# Patient Record
Sex: FEMALE | Race: WHITE | NOT HISPANIC OR LATINO | Employment: UNEMPLOYED | URBAN - NONMETROPOLITAN AREA
[De-identification: names, ages, dates, MRNs, and addresses within clinical notes are randomized per-mention and may not be internally consistent; named-entity substitution may affect disease eponyms.]

---

## 2023-07-15 ENCOUNTER — HOSPITAL ENCOUNTER (EMERGENCY)
Facility: HOSPITAL | Age: 63
Discharge: HOME | End: 2023-07-15
Attending: FAMILY MEDICINE
Payer: COMMERCIAL

## 2023-07-15 VITALS
WEIGHT: 155 LBS | RESPIRATION RATE: 18 BRPM | HEIGHT: 66 IN | BODY MASS INDEX: 24.91 KG/M2 | TEMPERATURE: 98 F | OXYGEN SATURATION: 97 % | DIASTOLIC BLOOD PRESSURE: 100 MMHG | SYSTOLIC BLOOD PRESSURE: 158 MMHG | HEART RATE: 78 BPM

## 2023-07-15 DIAGNOSIS — R07.9 CHEST PAIN: ICD-10-CM

## 2023-07-15 DIAGNOSIS — R42 DIZZINESS: ICD-10-CM

## 2023-07-15 LAB
ALBUMIN SERPL-MCNC: 4.8 G/DL (ref 3.4–5)
ALBUMIN/GLOB SERPL: 1.5 RATIO
ALP SERPL-CCNC: 96 UNIT/L (ref 50–144)
ALT SERPL-CCNC: 38 UNIT/L (ref 1–45)
ANION GAP SERPL CALC-SCNC: 8 MEQ/L (ref 2–13)
AST SERPL-CCNC: 42 UNIT/L (ref 14–36)
BASOPHILS # BLD AUTO: 0.04 X10(3)/MCL (ref 0.01–0.08)
BASOPHILS NFR BLD AUTO: 0.4 % (ref 0.1–1.2)
BILIRUBIN DIRECT+TOT PNL SERPL-MCNC: 0.7 MG/DL (ref 0–1)
BNP BLD-MCNC: 103 PG/ML (ref 10–450)
BUN SERPL-MCNC: 14 MG/DL (ref 7–20)
CALCIUM SERPL-MCNC: 9.6 MG/DL (ref 8.4–10.2)
CHLORIDE SERPL-SCNC: 105 MMOL/L (ref 98–110)
CK MB SERPL-MCNC: 0.32 NG/ML (ref 0–3.38)
CK SERPL-CCNC: 35 U/L (ref 30–135)
CO2 SERPL-SCNC: 27 MMOL/L (ref 21–32)
CREAT SERPL-MCNC: 0.72 MG/DL (ref 0.66–1.25)
CREAT/UREA NIT SERPL: 19 (ref 12–20)
EOSINOPHIL # BLD AUTO: 0.03 X10(3)/MCL (ref 0.04–0.36)
EOSINOPHIL NFR BLD AUTO: 0.3 % (ref 0.7–7)
ERYTHROCYTE [DISTWIDTH] IN BLOOD BY AUTOMATED COUNT: 12.5 % (ref 11–14.5)
GFR SERPLBLD CREATININE-BSD FMLA CKD-EPI: >90 MLS/MIN/1.73/M2
GLOBULIN SER-MCNC: 3.1 GM/DL (ref 2–3.9)
GLUCOSE SERPL-MCNC: 136 MG/DL (ref 70–115)
HCT VFR BLD AUTO: 41 % (ref 36–48)
HGB BLD-MCNC: 13.8 G/DL (ref 11.8–16)
IMM GRANULOCYTES # BLD AUTO: 0.03 X10(3)/MCL (ref 0–0.03)
IMM GRANULOCYTES NFR BLD AUTO: 0.3 % (ref 0–0.5)
LYMPHOCYTES # BLD AUTO: 1.86 X10(3)/MCL (ref 1.16–3.74)
LYMPHOCYTES NFR BLD AUTO: 19.7 % (ref 20–55)
MCH RBC QN AUTO: 28.6 PG (ref 27–34)
MCHC RBC AUTO-ENTMCNC: 33.7 G/DL (ref 31–37)
MCV RBC AUTO: 84.9 FL (ref 79–99)
MONOCYTES # BLD AUTO: 0.55 X10(3)/MCL (ref 0.24–0.36)
MONOCYTES NFR BLD AUTO: 5.8 % (ref 4.7–12.5)
NEUTROPHILS # BLD AUTO: 6.94 X10(3)/MCL (ref 1.56–6.13)
NEUTROPHILS NFR BLD AUTO: 73.5 % (ref 37–73)
NRBC BLD AUTO-RTO: 0 %
PLATELET # BLD AUTO: 284 X10(3)/MCL (ref 140–371)
PMV BLD AUTO: 11.8 FL (ref 9.4–12.4)
POTASSIUM SERPL-SCNC: 4 MMOL/L (ref 3.5–5.1)
PROT SERPL-MCNC: 7.9 GM/DL (ref 6.3–8.2)
RBC # BLD AUTO: 4.83 X10(6)/MCL (ref 4–5.1)
SODIUM SERPL-SCNC: 140 MMOL/L (ref 135–145)
TROPONIN I SERPL-MCNC: <0.012 NG/ML (ref 0–0.03)
WBC # SPEC AUTO: 9.45 X10(3)/MCL (ref 4–11.5)

## 2023-07-15 PROCEDURE — 83880 ASSAY OF NATRIURETIC PEPTIDE: CPT | Performed by: FAMILY MEDICINE

## 2023-07-15 PROCEDURE — 99285 EMERGENCY DEPT VISIT HI MDM: CPT | Mod: 25

## 2023-07-15 PROCEDURE — 93005 ELECTROCARDIOGRAM TRACING: CPT

## 2023-07-15 PROCEDURE — 85025 COMPLETE CBC W/AUTO DIFF WBC: CPT | Performed by: FAMILY MEDICINE

## 2023-07-15 PROCEDURE — 93010 ELECTROCARDIOGRAM REPORT: CPT | Mod: ,,, | Performed by: INTERNAL MEDICINE

## 2023-07-15 PROCEDURE — 80053 COMPREHEN METABOLIC PANEL: CPT | Performed by: FAMILY MEDICINE

## 2023-07-15 PROCEDURE — 84484 ASSAY OF TROPONIN QUANT: CPT | Performed by: FAMILY MEDICINE

## 2023-07-15 PROCEDURE — 36415 COLL VENOUS BLD VENIPUNCTURE: CPT | Performed by: FAMILY MEDICINE

## 2023-07-15 PROCEDURE — 82550 ASSAY OF CK (CPK): CPT | Performed by: FAMILY MEDICINE

## 2023-07-15 PROCEDURE — 93010 EKG 12-LEAD: ICD-10-PCS | Mod: ,,, | Performed by: INTERNAL MEDICINE

## 2023-07-15 PROCEDURE — 82553 CREATINE MB FRACTION: CPT | Performed by: FAMILY MEDICINE

## 2023-07-15 NOTE — ED PROVIDER NOTES
"Encounter Date: 7/15/2023    SCRIBE #1 NOTE: I, am scribing for, and in the presence of,  . I have scribed the entire note.     History       Chief Complaint  Patient presents with Dizziness Vomiting Pt woke up at 6:30am to her alarm and noticed she was dizziness when she stood up to walk, also c/o redness and swelling of the eyes since waking up and vomiting x 1 today, states "I think it is just my sinuses." Pt took an OTC Sinus medication but vomited immediately after.        Review of patient's allergies indicates:  No Known Allergies  History reviewed. No pertinent past medical history.  Past Surgical History:   Procedure Laterality Date     SECTION, CLASSIC N/A     CHOLECYSTECTOMY       History reviewed. No pertinent family history.  Social History     Tobacco Use    Smoking status: Never    Smokeless tobacco: Never   Substance Use Topics    Alcohol use: Never    Drug use: Never     Review of Systems   Constitutional:  Negative for fever.   HENT:  Negative for congestion, dental problem, drooling, ear discharge, ear pain and sore throat.    Eyes:  Negative for photophobia, pain, discharge, redness and itching.   Respiratory:  Negative for shortness of breath.    Cardiovascular:  Negative for chest pain.   Gastrointestinal:  Negative for nausea.   Endocrine: Negative for cold intolerance, heat intolerance and polydipsia.   Genitourinary:  Negative for dysuria.   Musculoskeletal:  Negative for back pain.   Skin:  Negative for rash.   Neurological:  Positive for dizziness. Negative for facial asymmetry, weakness, light-headedness, numbness and headaches.   Hematological:  Does not bruise/bleed easily.   Psychiatric/Behavioral:  Negative for agitation, behavioral problems, confusion, decreased concentration and dysphoric mood.      Physical Exam     Initial Vitals [07/15/23 1055]   BP Pulse Resp Temp SpO2   (!) 150/108 86 18 97.6 °F (36.4 °C) 97 %      MAP       --         Physical Exam    Nursing note and " vitals reviewed.  Constitutional: She appears well-developed.   Eyes: Pupils are equal, round, and reactive to light.   Neck:   Normal range of motion.  Cardiovascular:  Normal rate, regular rhythm, normal heart sounds and intact distal pulses.           Pulmonary/Chest: Breath sounds normal.   Abdominal: Abdomen is soft. Bowel sounds are normal. She exhibits no distension. There is no abdominal tenderness. There is no rebound and no guarding.   Musculoskeletal:         General: Normal range of motion.      Cervical back: Normal range of motion.     Skin: Skin is warm.   Psychiatric: She has a normal mood and affect.       ED Course   Procedures  Labs Reviewed   COMPREHENSIVE METABOLIC PANEL - Abnormal; Notable for the following components:       Result Value    Glucose Level 136 (*)     Aspartate Aminotransferase 42 (*)     All other components within normal limits   CBC WITH DIFFERENTIAL - Abnormal; Notable for the following components:    Neut % 73.5 (*)     Lymph % 19.7 (*)     Eos % 0.3 (*)     Neut # 6.94 (*)     Mono # 0.55 (*)     Eos # 0.03 (*)     All other components within normal limits   CK-MB - Normal   CK - Normal   TROPONIN I - Normal   NT-PRO NATRIURETIC PEPTIDE - Normal   CBC W/ AUTO DIFFERENTIAL    Narrative:     The following orders were created for panel order CBC Auto Differential.  Procedure                               Abnormality         Status                     ---------                               -----------         ------                     CBC with Differential[[id number]]        Abnormal            Final result                 Please view results for these tests on the individual orders.     EKG Readings: (Independently Interpreted)   Initial Reading: No STEMI. Previous EKG: Compared with most recent EKG Rhythm: Normal Sinus Rhythm.     Imaging Results              CT Head Without Contrast (Final result)  Result time 07/15/23 12:11:25      Final result by Silver Sebastian III, MD  (07/15/23 12:11:25)                   Impression:      1. Mild cerebral atrophy is present and most pronounced within the frontal lobes.      Electronically signed by: Silver Sebastian  Date:    07/15/2023  Time:    12:11               Narrative:    EXAMINATION:  STUDY: CT HEAD WITHOUT CONTRAST    CLINICAL HISTORY AND TECHNIQUE:  Phoebe Workman, RT on 7/15/2023 12:06 PM    PT STATUS: ER PT    PROCEDURE: CT BRAIN WO CONT    CLINICAL HX:    X THIS AM    - C/O DIZZINESS AND REDNESS/ SWELLING OF EYES AND VOMITING    PMH: , MORENA    TECHNIQUE:    IV CONTRAST: NONE    ORAL CONTRAST: NONE    RECTAL CONTRAST: NONE    AXIAL IMAGES @ 5MM INTERVALS WITH MULTI PLANAR RECONSTRUCTION OF IMAGES    TOTAL # OF CT SCANS IN PAST 12 MONTHS: 1    TOTAL IMAGE NUMBER: 35    CTDIvol(mGy): HEAD: 57.50 BODY:    DLP(mGycm): HEAD: 1068.10 BODY:    TECH: NN    PT TRANSPORTED W\O INCIDENT    This patient has had 1 CT and nuclear medicine scans performed within the last 12 months.    The following DOSE REDUCTION TECHNIQUES are used for all CT scans at [facility]:    1. Automated exposure control.  2. Adjustment of the mA and/or kv according to patient size.  3. Use of iterative reconstruction technique.    COMPARISON:  None    FINDINGS:  Axial imaging through the head/brain was performed. Mild cerebral atrophy is present most pronounced within the frontal lobes.  I see no intraparenchymal masses, hemorrhagic lesions, or dominant wedge-shaped infarcts. I see no extra-axial masses or abnormal fluid collections.                                       X-Ray Chest AP Portable (Final result)  Result time 07/15/23 12:12:00      Final result by Silver Sebastian III, MD (07/15/23 12:12:00)                   Impression:      1. I see no lobar or segmental infiltrates or other significant abnormalities.See above comments.      Electronically signed by: Silver Sebastian  Date:    07/15/2023  Time:    12:12               Narrative:     EXAMINATION:  STUDY: XR CHEST AP PORTABLE    CLINICAL HISTORY AND TECHNIQUE:  Phoebe Workman, RT on 7/15/2023 11:46 AM    CLINICAL HX:  ER PT    X THIS AM    - C/O DIZZINESS AND REDNESS/ SWELLING OF EYES AND VOMITING    PAST MEDICAL HX: , MORENA    TECHNIQUE: 1V PORTABLE CHEST    TECH: NN    PT TRANSPORTED WO INCIDENT    COMPARISON:  None    FINDINGS:  Mildly increased interstitial lung markings are exaggerated by the less than optimal inspiratory effort.The cardiac, hilar, and mediastinal contours appear unremarkable.I see no lobar or segmental infiltrates.No significant pleural effusions are noted.There is moderate demineralization of the skeletal structures with mild to moderate degenerative changes noted involving the thoracic spine.                                    X-Rays:   Independently Interpreted Readings:   Other Readings:  No acute findings   Medications - No data to display  Medical Decision Making:   ED Management:  CT scan and the cardiac workup was negative the patient was advised to follow up with a cardiologist for her dizziness the patient felt fine                        Clinical Impression:   Final diagnoses:  [R07.9] Chest pain  [R42] Dizziness        ED Disposition Condition    Discharge Stable          ED Prescriptions    None       Follow-up Information       Follow up With Specialties Details Why Contact Info    pcp  In 1 day               Jaden Nicolas MD  [phone]

## 2023-12-05 ENCOUNTER — HOSPITAL ENCOUNTER (OUTPATIENT)
Dept: RADIOLOGY | Facility: HOSPITAL | Age: 63
Discharge: HOME | End: 2023-12-05
Attending: OTOLARYNGOLOGY
Payer: COMMERCIAL

## 2023-12-05 DIAGNOSIS — H90.41 SENSORINEURAL HEARING LOSS, UNILATERAL, RIGHT EAR, WITH UNRESTRICTED HEARING ON THE CONTRALATERAL SIDE: ICD-10-CM

## 2023-12-05 PROCEDURE — 70553 MRI BRAIN STEM W/O & W/DYE: CPT | Mod: TC

## 2023-12-05 PROCEDURE — A9577 INJ MULTIHANCE: HCPCS | Performed by: OTOLARYNGOLOGY

## 2023-12-05 PROCEDURE — 25500020 PHARM REV CODE 255: Performed by: OTOLARYNGOLOGY

## 2023-12-05 RX ADMIN — GADOBENATE DIMEGLUMINE 15 ML: 529 INJECTION, SOLUTION INTRAVENOUS at 09:12

## 2025-05-06 ENCOUNTER — HOSPITAL ENCOUNTER (OUTPATIENT)
Dept: RADIOLOGY | Facility: HOSPITAL | Age: 65
Discharge: HOME | End: 2025-05-06
Attending: FAMILY MEDICINE
Payer: MEDICARE

## 2025-05-06 DIAGNOSIS — Z12.31 ENCOUNTER FOR SCREENING MAMMOGRAM FOR MALIGNANT NEOPLASM OF BREAST: ICD-10-CM

## 2025-05-06 PROCEDURE — 77067 SCR MAMMO BI INCL CAD: CPT | Mod: TC

## 2025-07-31 ENCOUNTER — HOSPITAL ENCOUNTER (OUTPATIENT)
Dept: PREADMISSION TESTING | Facility: HOSPITAL | Age: 65
Discharge: HOME | End: 2025-07-31
Attending: FAMILY MEDICINE
Payer: MEDICARE

## 2025-07-31 VITALS — HEIGHT: 66 IN | BODY MASS INDEX: 26.39 KG/M2 | WEIGHT: 164.19 LBS

## 2025-07-31 DIAGNOSIS — Z12.11 SCREEN FOR COLON CANCER: ICD-10-CM

## 2025-07-31 DIAGNOSIS — Z12.11 COLON CANCER SCREENING: Primary | ICD-10-CM

## 2025-07-31 RX ORDER — METFORMIN HYDROCHLORIDE 500 MG/1
500 TABLET, EXTENDED RELEASE ORAL NIGHTLY
COMMUNITY
Start: 2025-07-28

## 2025-07-31 RX ORDER — ROSUVASTATIN CALCIUM 20 MG/1
20 TABLET, COATED ORAL NIGHTLY
COMMUNITY
Start: 2025-07-14

## 2025-07-31 NOTE — DISCHARGE INSTRUCTIONS

## 2025-08-01 ENCOUNTER — ANESTHESIA EVENT (OUTPATIENT)
Dept: GASTROENTEROLOGY | Facility: HOSPITAL | Age: 65
End: 2025-08-01
Payer: MEDICARE

## 2025-08-01 NOTE — ANESTHESIA PREPROCEDURE EVALUATION
2025  Taryn Douglas is a 65 y.o., female.Procedure Information    Date/Time: 25 0830   Scheduled providers: Van Perez MD   Procedure: COLONOSCOPY   Diagnosis: Colon cancer screening [Z12.11]   Location: [facility]       Anesthesia History    No specialty history recorded    Other Medical History   Diabetes mellitus High cholesterol     Surgical History    CHOLECYSTECTOMY  SECTION, CLASSIC     COVID-19 Risk of Complications  Current as of 5 hours ago    1 0 to < 3 Points: Low Risk   3 to < 6 Points: Medium Risk   6 to 16 Points: High Risk     Last Change: N/A      Details   Substance History    Smoking Status: Never   Passive Exposure: Never   Smokeless Tobacco Status: Never   Alcohol use: Never   Drug use: Never     Anesthesia Family History    No family medical history recorded     Current Gender Identity    Questions Responses   Patient's Gender Identity:    Patient's sex assigned at birth:         Pre-op Assessment    I have reviewed the Patient Summary Reports.     I have reviewed the Nursing Notes. I have reviewed the NPO Status.   I have reviewed the Medications.     Review of Systems  Anesthesia Hx:  No problems with previous Anesthesia             Denies Family Hx of Anesthesia complications.    Denies Personal Hx of Anesthesia complications.                    Hematology/Oncology:  Hematology Normal   Oncology Normal                                   EENT/Dental:  EENT/Dental Normal           Cardiovascular:  Exercise tolerance: good              hyperlipidemia                               Pulmonary:  Pulmonary Normal                       Renal/:  Renal/ Normal                 Hepatic/GI:  Hepatic/GI Normal                    Musculoskeletal:  Musculoskeletal Normal                Neurological:  Neurology Normal                                       Endocrine:  Diabetes           Dermatological:  Skin Normal    Psych:  Psychiatric Normal                    Physical Exam  General: Well nourished, Cooperative, Alert and Oriented    Airway:  Mallampati: II / II  Mouth Opening: Normal  TM Distance: 4 - 6 cm  Tongue: Normal  Neck ROM: Normal ROM    Dental:  Intact        Anesthesia Plan  Type of Anesthesia, risks & benefits discussed:    Anesthesia Type: MAC, Gen Natural Airway  Intra-op Monitoring Plan: Standard ASA Monitors  Post Op Pain Control Plan: multimodal analgesia  Induction:  IV  Airway Plan: Direct  Informed Consent: Informed consent signed with the Patient and all parties understand the risks and agree with anesthesia plan.  All questions answered. Patient consented to blood products? Yes  ASA Score: 2  Day of Surgery Review of History & Physical: H&P Update referred to the surgeon/provider.I have interviewed and examined the patient. I have reviewed the patient's H&P dated: There are no significant changes.     Ready For Surgery From Anesthesia Perspective.     .

## 2025-08-05 ENCOUNTER — HOSPITAL ENCOUNTER (OUTPATIENT)
Dept: GASTROENTEROLOGY | Facility: HOSPITAL | Age: 65
Discharge: HOME | End: 2025-08-05
Attending: FAMILY MEDICINE
Payer: MEDICARE

## 2025-08-05 ENCOUNTER — ANESTHESIA (OUTPATIENT)
Dept: GASTROENTEROLOGY | Facility: HOSPITAL | Age: 65
End: 2025-08-05
Payer: MEDICARE

## 2025-08-05 VITALS
SYSTOLIC BLOOD PRESSURE: 155 MMHG | TEMPERATURE: 97 F | BODY MASS INDEX: 26.5 KG/M2 | OXYGEN SATURATION: 98 % | DIASTOLIC BLOOD PRESSURE: 81 MMHG | HEART RATE: 75 BPM | RESPIRATION RATE: 20 BRPM | WEIGHT: 164.19 LBS

## 2025-08-05 DIAGNOSIS — Z12.11 COLON CANCER SCREENING: ICD-10-CM

## 2025-08-05 DIAGNOSIS — Z12.11 SCREEN FOR COLON CANCER: ICD-10-CM

## 2025-08-05 PROCEDURE — 37000009 HC ANESTHESIA EA ADD 15 MINS

## 2025-08-05 PROCEDURE — 63600175 PHARM REV CODE 636 W HCPCS: Performed by: NURSE ANESTHETIST, CERTIFIED REGISTERED

## 2025-08-05 PROCEDURE — 37000008 HC ANESTHESIA 1ST 15 MINUTES

## 2025-08-05 PROCEDURE — 25000003 PHARM REV CODE 250: Performed by: FAMILY MEDICINE

## 2025-08-05 PROCEDURE — 27201423 OPTIME MED/SURG SUP & DEVICES STERILE SUPPLY

## 2025-08-05 PROCEDURE — 82962 GLUCOSE BLOOD TEST: CPT

## 2025-08-05 RX ORDER — LIDOCAINE HYDROCHLORIDE 20 MG/ML
INJECTION INTRAVENOUS
Status: DISCONTINUED | OUTPATIENT
Start: 2025-08-05 | End: 2025-08-05

## 2025-08-05 RX ORDER — PROPOFOL 10 MG/ML
VIAL (ML) INTRAVENOUS
Status: DISCONTINUED | OUTPATIENT
Start: 2025-08-05 | End: 2025-08-05

## 2025-08-05 RX ORDER — SODIUM CHLORIDE 9 MG/ML
INJECTION, SOLUTION INTRAVENOUS CONTINUOUS
Status: DISCONTINUED | OUTPATIENT
Start: 2025-08-05 | End: 2025-08-06 | Stop reason: HOSPADM

## 2025-08-05 RX ADMIN — PROPOFOL 60 MG: 10 INJECTION, EMULSION INTRAVENOUS at 08:08

## 2025-08-05 RX ADMIN — PROPOFOL 20 MG: 10 INJECTION, EMULSION INTRAVENOUS at 08:08

## 2025-08-05 RX ADMIN — SODIUM CHLORIDE: 9 INJECTION, SOLUTION INTRAVENOUS at 07:08

## 2025-08-05 RX ADMIN — LIDOCAINE HYDROCHLORIDE 100 MG: 20 INJECTION, SOLUTION INTRAVENOUS at 08:08

## 2025-08-05 NOTE — INTERVAL H&P NOTE
Th H&P has been reviewed. I concur with the findings and no changes have occurred since H&P was written.

## 2025-08-05 NOTE — ANESTHESIA POSTPROCEDURE EVALUATION
Anesthesia Post Evaluation    Patient: Taryn Douglas    Procedure(s) Performed: * No procedures listed *    Final Anesthesia Type: general      Patient location during evaluation: OPS  Patient participation: Yes- Able to Participate  Level of consciousness: awake  Post-procedure vital signs: reviewed and stable  Pain management: adequate  Airway patency: patent  BEN mitigation strategies: Multimodal analgesia  PONV status at discharge: No PONV  Anesthetic complications: no      Cardiovascular status: hemodynamically stable  Respiratory status: unassisted, room air and spontaneous ventilation  Hydration status: euvolemic  Follow-up not needed.              Vitals Value Taken Time   /86 08/05/25 06:45   Temp 36.3 °C (97.4 °F) 08/05/25 06:45   Pulse 94 08/05/25 06:45   Resp 20 08/05/25 06:45   SpO2 98 % 08/05/25 06:45         No case tracking events are documented in the log.      Pain/Kemal Score: No data recorded

## 2025-08-05 NOTE — DISCHARGE INSTRUCTIONS
Follow-up with Dr CRAWFORD'S  Diet: as tolerated  Activity:  decrease activity today, no driving today, resume all activity tomorrow.  Notify MD:  increased swelling of abdomen, excessive nausea/vomiting, excessive bright red bleeding from rectum.  Medications:  continue your home medications. Keep a list of your home medications at all times for emergencies.

## 2025-08-06 LAB — POCT GLUCOSE: 151 MG/DL (ref 70–110)

## 2025-08-08 LAB — BEAKER SEE SCANNED REPORT: NORMAL
